# Patient Record
Sex: FEMALE | Race: BLACK OR AFRICAN AMERICAN | NOT HISPANIC OR LATINO | URBAN - METROPOLITAN AREA
[De-identification: names, ages, dates, MRNs, and addresses within clinical notes are randomized per-mention and may not be internally consistent; named-entity substitution may affect disease eponyms.]

---

## 2018-08-20 ENCOUNTER — OFFICE VISIT (OUTPATIENT)
Dept: URBAN - METROPOLITAN AREA CLINIC 23 | Facility: CLINIC | Age: 78
End: 2018-08-20
Payer: COMMERCIAL

## 2018-08-20 DIAGNOSIS — H40.013 OPEN ANGLE WITH BORDERLINE FINDINGS, LOW RISK, BILATERAL: Primary | ICD-10-CM

## 2018-08-20 PROCEDURE — 92014 COMPRE OPH EXAM EST PT 1/>: CPT | Performed by: OPTOMETRIST

## 2018-08-20 ASSESSMENT — KERATOMETRY
OD: 43.25
OS: 43.00

## 2018-08-20 ASSESSMENT — INTRAOCULAR PRESSURE
OD: 18
OS: 20

## 2018-08-20 ASSESSMENT — VISUAL ACUITY
OD: 20/40
OS: 20/25

## 2018-08-20 NOTE — IMPRESSION/PLAN
Impression: Open angle with borderline findings, low risk, bilateral: H40.013 OU. Plan: Borderline glaucoma, intraocular pressure stable without medication. Continue without medication and observe.

## 2018-08-20 NOTE — IMPRESSION/PLAN
Impression: Puckering of macula, left eye: H35.372. Plan: Discussed diagnosis in detail with patient. No treatment is required at this time. Will continue to observe condition and or symptoms. Patient instructed to call if condition gets worse.

## 2018-08-20 NOTE — IMPRESSION/PLAN
Impression: Dry eye syndrome of bilateral lacrimal glands: H04.123. Plan: Discussed diagnosis with patient. Recommend artificial tears for comfort.

## 2018-11-01 ENCOUNTER — OFFICE VISIT (OUTPATIENT)
Dept: URBAN - METROPOLITAN AREA CLINIC 23 | Facility: CLINIC | Age: 78
End: 2018-11-01

## 2018-11-01 ASSESSMENT — VISUAL ACUITY
OD: 20/40-
OS: 20/20

## 2020-08-12 ENCOUNTER — OFFICE VISIT (OUTPATIENT)
Dept: URBAN - METROPOLITAN AREA CLINIC 23 | Facility: CLINIC | Age: 80
End: 2020-08-12
Payer: COMMERCIAL

## 2020-08-12 DIAGNOSIS — H40.1134 PRIMARY OPEN-ANGLE GLAUCOMA, BILATERAL, INDETERMINATE STAGE: Primary | ICD-10-CM

## 2020-08-12 PROCEDURE — 92014 COMPRE OPH EXAM EST PT 1/>: CPT | Performed by: OPTOMETRIST

## 2020-08-12 ASSESSMENT — INTRAOCULAR PRESSURE
OS: 20
OD: 22

## 2020-08-12 ASSESSMENT — KERATOMETRY
OD: 42.63
OS: 42.75

## 2020-08-12 NOTE — IMPRESSION/PLAN
Impression: Primary open-angle glaucoma, bilateral, indeterminate stage: H40.1134 OU. Plan: Borderline glaucoma, intraocular pressure stable without medication. Continue without medication and observe.

## 2020-08-12 NOTE — IMPRESSION/PLAN
Impression: Puckering of macula, left eye: H35.372 OS. Plan: Discussed diagnosis in detail with patient. No treatment is required at this time. Reassured patient of current condition and treatment. Will continue to observe condition and or symptoms.

## 2020-08-21 ENCOUNTER — OFFICE VISIT (OUTPATIENT)
Dept: URBAN - METROPOLITAN AREA CLINIC 22 | Facility: CLINIC | Age: 80
End: 2020-08-21
Payer: COMMERCIAL

## 2020-08-21 DIAGNOSIS — Z96.1 PRESENCE OF INTRAOCULAR LENS: ICD-10-CM

## 2020-08-21 DIAGNOSIS — H52.03 HYPERMETROPIA, BILATERAL: Primary | ICD-10-CM

## 2020-08-21 PROCEDURE — 92014 COMPRE OPH EXAM EST PT 1/>: CPT | Performed by: OPTOMETRIST

## 2020-08-21 ASSESSMENT — VISUAL ACUITY
OS: 20/20
OD: 20/30

## 2020-08-21 ASSESSMENT — INTRAOCULAR PRESSURE
OS: 21
OD: 25

## 2020-08-21 NOTE — IMPRESSION/PLAN
Impression: Primary open-angle glaucoma, bilateral, stage unspecified: H40.1130.  Plan: marialuisa Zavala

## 2021-12-23 ENCOUNTER — OFFICE VISIT (OUTPATIENT)
Dept: URBAN - METROPOLITAN AREA CLINIC 22 | Facility: CLINIC | Age: 81
End: 2021-12-23
Payer: MEDICARE

## 2021-12-23 DIAGNOSIS — H04.123 DRY EYE SYNDROME OF BILATERAL LACRIMAL GLANDS: ICD-10-CM

## 2021-12-23 DIAGNOSIS — H35.372 PUCKERING OF MACULA, LEFT EYE: ICD-10-CM

## 2021-12-23 DIAGNOSIS — H40.1130 PRIMARY OPEN-ANGLE GLAUCOMA, BILATERAL, STAGE UNSPECIFIED: Primary | ICD-10-CM

## 2021-12-23 PROCEDURE — 99214 OFFICE O/P EST MOD 30 MIN: CPT | Performed by: STUDENT IN AN ORGANIZED HEALTH CARE EDUCATION/TRAINING PROGRAM

## 2021-12-23 PROCEDURE — 92133 CPTRZD OPH DX IMG PST SGM ON: CPT | Performed by: STUDENT IN AN ORGANIZED HEALTH CARE EDUCATION/TRAINING PROGRAM

## 2021-12-23 RX ORDER — LATANOPROST 50 UG/ML
0.005 % SOLUTION OPHTHALMIC
Qty: 7.5 | Refills: 3 | Status: ACTIVE
Start: 2021-12-23

## 2021-12-23 ASSESSMENT — INTRAOCULAR PRESSURE
OD: 24
OS: 21
OS: 19
OD: 30

## 2021-12-23 ASSESSMENT — KERATOMETRY
OS: 43.00
OD: 42.75

## 2021-12-23 NOTE — IMPRESSION/PLAN
Impression: Primary open-angle glaucoma, bilateral, stage unspecified: H40.1130.
-- IOP 24/19 (post-dil 30/20)
-- OCT 12/23/21: OD 87; thin IT
                            OS 99; bdl nasal
-- fam hx: son Plan: Discussed findings and educated patient on condition, risk factors, treatment. Elevated IOP OD > OS today. OCT RNFL ordered shows inf temp thinning OD only. Recommend starting treatment to lower IOP OD. Discussed side effects. Stressed importance of compliance. Rx latanoprost QHS OU. RTC in 2 months for HVF 24-2 and IOP check.

## 2021-12-23 NOTE — IMPRESSION/PLAN
Impression: Puckering of macula, left eye: H35.372. Plan: Discussed findings. Treatment not indicated at this time. VA improved with autorefraction, recommend updating glasses. Monitor in 1 year with DFE and OCT macula.

## 2022-01-03 ENCOUNTER — OFFICE VISIT (OUTPATIENT)
Dept: URBAN - METROPOLITAN AREA CLINIC 22 | Facility: CLINIC | Age: 82
End: 2022-01-03
Payer: COMMERCIAL

## 2022-01-03 DIAGNOSIS — H52.4 PRESBYOPIA: Primary | ICD-10-CM

## 2022-01-03 PROCEDURE — 92014 COMPRE OPH EXAM EST PT 1/>: CPT | Performed by: STUDENT IN AN ORGANIZED HEALTH CARE EDUCATION/TRAINING PROGRAM

## 2022-01-03 ASSESSMENT — VISUAL ACUITY
OD: 20/50
OS: 20/20

## 2022-01-03 ASSESSMENT — INTRAOCULAR PRESSURE
OS: 16
OD: 20

## 2022-01-03 ASSESSMENT — KERATOMETRY
OS: 43.38
OD: 43.13

## 2022-02-23 ENCOUNTER — OFFICE VISIT (OUTPATIENT)
Dept: URBAN - METROPOLITAN AREA CLINIC 22 | Facility: CLINIC | Age: 82
End: 2022-02-23
Payer: MEDICARE

## 2022-02-23 DIAGNOSIS — H40.1111 PRIMARY OPEN-ANGLE GLAUCOMA, RIGHT EYE, MILD STAGE: Primary | ICD-10-CM

## 2022-02-23 PROCEDURE — 92083 EXTENDED VISUAL FIELD XM: CPT | Performed by: STUDENT IN AN ORGANIZED HEALTH CARE EDUCATION/TRAINING PROGRAM

## 2022-02-23 PROCEDURE — 99213 OFFICE O/P EST LOW 20 MIN: CPT | Performed by: STUDENT IN AN ORGANIZED HEALTH CARE EDUCATION/TRAINING PROGRAM

## 2022-02-23 ASSESSMENT — INTRAOCULAR PRESSURE
OS: 16
OD: 15
OS: 14

## 2022-02-23 NOTE — IMPRESSION/PLAN
Impression: Primary open-angle glaucoma, right eye, mild stage: H40.1111.
-- IOP 24/19 (post-dil 30/20) -- tx: latanoprost QHS OU
-- OCT 12/23/21: OD 87; thin IT
                            OS 99; bdl nasal
-- HVF 02/23/22: OD sup arc - stable OS inf edge - stable
-- fam hx: son Plan: Discussed findings. Patient educated on importance of compliance with follow-ups and treatment. HVF 24-2 today shows stable superior arcuate defect OD. Continue with latanoprost QHS OU. RTC in 6 months for DFE/OCT.

## 2022-08-23 ENCOUNTER — OFFICE VISIT (OUTPATIENT)
Dept: URBAN - METROPOLITAN AREA CLINIC 22 | Facility: CLINIC | Age: 82
End: 2022-08-23
Payer: MEDICARE

## 2022-08-23 DIAGNOSIS — Z96.1 PRESENCE OF INTRAOCULAR LENS: ICD-10-CM

## 2022-08-23 DIAGNOSIS — H40.1111 PRIMARY OPEN-ANGLE GLAUCOMA, RIGHT EYE, MILD STAGE: ICD-10-CM

## 2022-08-23 DIAGNOSIS — H40.1131 PRIMARY OPEN-ANGLE GLAUCOMA, MILD STAGE, BILATERAL: Primary | ICD-10-CM

## 2022-08-23 DIAGNOSIS — H35.372 PUCKERING OF MACULA, LEFT EYE: ICD-10-CM

## 2022-08-23 PROCEDURE — 92083 EXTENDED VISUAL FIELD XM: CPT | Performed by: STUDENT IN AN ORGANIZED HEALTH CARE EDUCATION/TRAINING PROGRAM

## 2022-08-23 PROCEDURE — 92133 CPTRZD OPH DX IMG PST SGM ON: CPT | Performed by: STUDENT IN AN ORGANIZED HEALTH CARE EDUCATION/TRAINING PROGRAM

## 2022-08-23 PROCEDURE — 99214 OFFICE O/P EST MOD 30 MIN: CPT | Performed by: STUDENT IN AN ORGANIZED HEALTH CARE EDUCATION/TRAINING PROGRAM

## 2022-08-23 RX ORDER — BRIMONIDINE TARTRATE 2 MG/ML
0.2 % SOLUTION/ DROPS OPHTHALMIC
Qty: 10 | Refills: 3 | Status: ACTIVE
Start: 2022-08-23

## 2022-08-23 ASSESSMENT — VISUAL ACUITY
OS: 20/20
OD: 20/30

## 2022-08-23 ASSESSMENT — INTRAOCULAR PRESSURE
OD: 20
OS: 20
OS: 26
OD: 26

## 2022-08-23 NOTE — IMPRESSION/PLAN
Impression: Puckering of macula, left eye: H35.372. Plan: Discussed findings and educated patient on condition. OCT ordered: mild. Surgical treatment not indicated at this time. Monitor in 1 year with DFE and OCT macula. Contact clinic sooner if any sudden worsening of vision.

## 2022-08-23 NOTE — IMPRESSION/PLAN
Impression: Primary open-angle glaucoma, mild stage, bilateral: H40.1131.
-- IOP 20/20, post-dil 26/26 (Tmax 27/21)
-- tx: latanoprost QHS OU
-- OCT 12/23/21: OD 87; thin IT
                            OS 99; bdl nasal
-- HVF 02/23/22: OD sup arc - stable OS inf edge - stable
-- fam hx: son Plan: Discussed findings. HVF today OD superior misses stable/improved. HVF OS stable shallow inferior edge misses. However IOP higher today OU. Patient does report she recently had cortisone shot into knee. Possible steroid response. Continue with latanoprost QHS OU. Add brimonidine BID OU. Patient educated on importance of compliance with follow-ups and treatment. RTC in 2 months for IOP check.

## 2022-08-23 NOTE — IMPRESSION/PLAN
Impression: Presence of intraocular lens: Z96.1. Plan: s/p YAG OD. Monitor, no treatment needed at this time.

## 2022-10-24 ENCOUNTER — OFFICE VISIT (OUTPATIENT)
Dept: URBAN - METROPOLITAN AREA CLINIC 22 | Facility: CLINIC | Age: 82
End: 2022-10-24
Payer: MEDICARE

## 2022-10-24 DIAGNOSIS — H40.1131 PRIMARY OPEN-ANGLE GLAUCOMA, MILD STAGE, BILATERAL: Primary | ICD-10-CM

## 2022-10-24 DIAGNOSIS — H16.223 KERATOCONJUNCT SICCA, NOT SPECIFIED AS SJOGREN'S, BILATERAL: ICD-10-CM

## 2022-10-24 PROCEDURE — 99214 OFFICE O/P EST MOD 30 MIN: CPT | Performed by: STUDENT IN AN ORGANIZED HEALTH CARE EDUCATION/TRAINING PROGRAM

## 2022-10-24 ASSESSMENT — INTRAOCULAR PRESSURE
OS: 18
OD: 18

## 2022-10-24 NOTE — IMPRESSION/PLAN
Impression: Keratoconjunct sicca, not specified as Sjogren's, bilateral: K33.686. Plan: Discussed today's findings and educated patient on condition. No FB observed. Rx artificial tears BID-QID OU (sample of Refresh Relieva given). Contact clinic if symptoms worsen.

## 2022-10-24 NOTE — IMPRESSION/PLAN
Impression: Primary open-angle glaucoma, mild stage, bilateral: H40.1131.
-- IOP 18/18 (Tmax 27/26)
-- tx: latanoprost QHS OU
-- OCT 12/23/21: OD 87; thin IT
                            OS 99; bdl nasal
-- HVF 02/23/22: OD sup arc - stable OS inf edge - stable
-- fam hx: son Plan: Discussed findings. IOP improved today vs. last visit; however patient admits to forgetting AM dose of brimonidine sometimes. Was not used this AM either. Hx of cortisone shot into knee, possible steroid response previously. Continue with latanoprost QHS OU and brimonidine BID OU. Stressed importance of compliance with follow-ups and treatment.

## 2023-02-06 ENCOUNTER — OFFICE VISIT (OUTPATIENT)
Dept: URBAN - METROPOLITAN AREA CLINIC 22 | Facility: CLINIC | Age: 83
End: 2023-02-06
Payer: MEDICARE

## 2023-02-06 DIAGNOSIS — H40.1131 PRIMARY OPEN-ANGLE GLAUCOMA, BILATERAL, MILD STAGE: Primary | ICD-10-CM

## 2023-02-06 PROCEDURE — 92083 EXTENDED VISUAL FIELD XM: CPT | Performed by: STUDENT IN AN ORGANIZED HEALTH CARE EDUCATION/TRAINING PROGRAM

## 2023-02-06 PROCEDURE — 99214 OFFICE O/P EST MOD 30 MIN: CPT | Performed by: STUDENT IN AN ORGANIZED HEALTH CARE EDUCATION/TRAINING PROGRAM

## 2023-02-06 RX ORDER — LATANOPROST 50 UG/ML
0.005 % SOLUTION OPHTHALMIC
Qty: 7.5 | Refills: 3 | Status: ACTIVE
Start: 2023-02-06

## 2023-02-06 RX ORDER — BRIMONIDINE TARTRATE AND TIMOLOL MALEATE 2; 5 MG/ML; MG/ML
SOLUTION/ DROPS OPHTHALMIC
Qty: 10 | Refills: 3 | Status: ACTIVE
Start: 2023-02-06

## 2023-02-06 ASSESSMENT — INTRAOCULAR PRESSURE
OS: 18
OD: 21

## 2023-02-06 NOTE — IMPRESSION/PLAN
Impression: Primary open-angle glaucoma, mild stage, bilateral: H40.1131.
-- IOP 21/18 (Tmax 27/26)
-- tx: latanoprost QHS OU
-- OCT 08/23/22: OD 91; bdl inf
                            ; WNL
-- HVF 02/06/23: OD sup arc progressing OS inf edge - stable
-- fam hx: son
-- possible steroid reponder; cortisone inj in knee
-- denies lung dz Plan: Discussed findings. HVF OD: superior nasal defects appear to be deepening HVF OS: inferior edge msises relatively stable IOP OD > OS today. Continue: latanoprost QHS OU
D/c brimonidine; Rx brimonidine-timolol BID OU instead. Stressed importance of compliance with follow-ups and treatment. RTC in 2 mo for IOP check.

## 2023-03-06 ENCOUNTER — OFFICE VISIT (OUTPATIENT)
Dept: URBAN - METROPOLITAN AREA OPTICAL 7 | Facility: OPTICAL | Age: 83
End: 2023-03-06
Payer: COMMERCIAL

## 2023-03-06 DIAGNOSIS — H52.4 PRESBYOPIA: Primary | ICD-10-CM

## 2023-03-06 PROCEDURE — V2781 PROGRESSIVE LENS PER LENS: HCPCS | Performed by: STUDENT IN AN ORGANIZED HEALTH CARE EDUCATION/TRAINING PROGRAM

## 2023-03-06 PROCEDURE — V2744 TINT PHOTOCHROMATIC LENS/ES: HCPCS | Performed by: STUDENT IN AN ORGANIZED HEALTH CARE EDUCATION/TRAINING PROGRAM

## 2023-03-06 PROCEDURE — V2750 ANTI-REFLECTIVE COATING: HCPCS | Performed by: STUDENT IN AN ORGANIZED HEALTH CARE EDUCATION/TRAINING PROGRAM

## 2023-03-06 PROCEDURE — V2755 UV LENS/ES: HCPCS | Performed by: STUDENT IN AN ORGANIZED HEALTH CARE EDUCATION/TRAINING PROGRAM

## 2023-03-06 PROCEDURE — 92014 COMPRE OPH EXAM EST PT 1/>: CPT | Performed by: STUDENT IN AN ORGANIZED HEALTH CARE EDUCATION/TRAINING PROGRAM

## 2023-03-06 PROCEDURE — V2784 LENS POLYCARB OR EQUAL: HCPCS | Performed by: STUDENT IN AN ORGANIZED HEALTH CARE EDUCATION/TRAINING PROGRAM

## 2023-03-06 ASSESSMENT — INTRAOCULAR PRESSURE
OD: 13
OS: 11

## 2023-03-06 ASSESSMENT — VISUAL ACUITY
OS: 20/20
OD: 20/25

## 2023-04-11 ENCOUNTER — OFFICE VISIT (OUTPATIENT)
Dept: URBAN - METROPOLITAN AREA CLINIC 22 | Facility: CLINIC | Age: 83
End: 2023-04-11
Payer: MEDICARE

## 2023-04-11 DIAGNOSIS — H40.1131 PRIMARY OPEN-ANGLE GLAUCOMA, MILD STAGE, BILATERAL: Primary | ICD-10-CM

## 2023-04-11 DIAGNOSIS — H52.4 PRESBYOPIA: ICD-10-CM

## 2023-04-11 PROCEDURE — 99214 OFFICE O/P EST MOD 30 MIN: CPT | Performed by: STUDENT IN AN ORGANIZED HEALTH CARE EDUCATION/TRAINING PROGRAM

## 2023-04-11 RX ORDER — DORZOLAMIDE HYDROCHLORIDE AND TIMOLOL MALEATE 20; 5 MG/ML; MG/ML
SOLUTION/ DROPS OPHTHALMIC
Qty: 10 | Refills: 0 | Status: ACTIVE
Start: 2023-04-11

## 2023-04-11 ASSESSMENT — INTRAOCULAR PRESSURE
OS: 16
OD: 16

## 2023-04-11 NOTE — IMPRESSION/PLAN
Impression: Primary open-angle glaucoma, mild stage, bilateral: H40.1131.
-- IOP 16/16 (Tmax 27/26)
-- tx: latanoprost QHS OU, combigan BID OU
-- OCT 08/23/22: OD 91; bdl inf
                            ; WNL
-- HVF 02/06/23: OD sup arc progressing OS inf edge - stable
-- fam hx: son
-- possible steroid reponder; cortisone inj in knee
-- denies lung dz Plan: Discussed findings. IOP improved w/ addition of timolol, however pt is having adverse rxn to brimonidine. Continue: latanoprost QHS OU
D/c brimonidine; Rx dorzolamide-timolol BID OU instead. Stressed importance of compliance with follow-ups and treatment. RTC in 2 mo for IOP check.

## 2023-06-12 ENCOUNTER — OFFICE VISIT (OUTPATIENT)
Dept: URBAN - METROPOLITAN AREA CLINIC 22 | Facility: CLINIC | Age: 83
End: 2023-06-12
Payer: MEDICARE

## 2023-06-12 DIAGNOSIS — H40.1131 PRIMARY OPEN-ANGLE GLAUCOMA, MILD STAGE, BILATERAL: Primary | ICD-10-CM

## 2023-06-12 PROCEDURE — 99214 OFFICE O/P EST MOD 30 MIN: CPT | Performed by: STUDENT IN AN ORGANIZED HEALTH CARE EDUCATION/TRAINING PROGRAM

## 2023-06-12 RX ORDER — DORZOLAMIDE HYDROCHLORIDE AND TIMOLOL MALEATE 20; 5 MG/ML; MG/ML
SOLUTION/ DROPS OPHTHALMIC
Qty: 20 | Refills: 3 | Status: ACTIVE
Start: 2023-06-12

## 2023-06-12 ASSESSMENT — INTRAOCULAR PRESSURE
OD: 21
OS: 15
OD: 20

## 2023-06-12 NOTE — IMPRESSION/PLAN
Impression: Primary open-angle glaucoma, mild stage, bilateral: H40.1131.
-- IOP 16/16 (Tmax 27/26)
-- tx: latanoprost QHS OU, Cosopt BID OU
--adverse rxn: brimonidine
-- OCT 08/23/22: OD 91; bdl inf
                            ; WNL
-- HVF 02/06/23: OD sup arc progressing OS inf edge - stable
-- fam hx: son
-- possible steroid reponder; cortisone inj in knee
-- denies lung dz Plan: Discussed findings. IOP elevated today, however patient ran out of Cosopt few days ago. Continue: latanoprost QHS OU, dorzolamide-timolol BID OU (refills sent) Stressed importance of compliance with follow-ups and treatment. RTC in 2 mo for DFE and OCT RNFL.

## 2023-08-15 ENCOUNTER — OFFICE VISIT (OUTPATIENT)
Dept: URBAN - METROPOLITAN AREA CLINIC 22 | Facility: CLINIC | Age: 83
End: 2023-08-15
Payer: MEDICARE

## 2023-08-15 DIAGNOSIS — H35.372 PUCKERING OF MACULA, LEFT EYE: ICD-10-CM

## 2023-08-15 DIAGNOSIS — Z96.1 PRESENCE OF INTRAOCULAR LENS: ICD-10-CM

## 2023-08-15 DIAGNOSIS — H40.1131 PRIMARY OPEN-ANGLE GLAUCOMA, MILD STAGE, BILATERAL: Primary | ICD-10-CM

## 2023-08-15 DIAGNOSIS — H16.223 KERATOCONJUNCT SICCA, NOT SPECIFIED AS SJOGREN'S, BILATERAL: ICD-10-CM

## 2023-08-15 PROCEDURE — 99214 OFFICE O/P EST MOD 30 MIN: CPT | Performed by: STUDENT IN AN ORGANIZED HEALTH CARE EDUCATION/TRAINING PROGRAM

## 2023-08-15 PROCEDURE — 92133 CPTRZD OPH DX IMG PST SGM ON: CPT | Performed by: STUDENT IN AN ORGANIZED HEALTH CARE EDUCATION/TRAINING PROGRAM

## 2023-08-15 ASSESSMENT — VISUAL ACUITY
OS: 20/20
OD: 20/50

## 2023-08-15 ASSESSMENT — INTRAOCULAR PRESSURE
OD: 14
OS: 12

## 2024-02-15 ENCOUNTER — OFFICE VISIT (OUTPATIENT)
Dept: URBAN - METROPOLITAN AREA CLINIC 22 | Facility: CLINIC | Age: 84
End: 2024-02-15
Payer: MEDICARE

## 2024-02-15 DIAGNOSIS — H40.1131 PRIMARY OPEN-ANGLE GLAUCOMA, BILATERAL, MILD STAGE: Primary | ICD-10-CM

## 2024-02-15 DIAGNOSIS — H52.4 PRESBYOPIA: ICD-10-CM

## 2024-02-15 PROCEDURE — 92083 EXTENDED VISUAL FIELD XM: CPT | Performed by: STUDENT IN AN ORGANIZED HEALTH CARE EDUCATION/TRAINING PROGRAM

## 2024-02-15 PROCEDURE — 92133 CPTRZD OPH DX IMG PST SGM ON: CPT | Performed by: STUDENT IN AN ORGANIZED HEALTH CARE EDUCATION/TRAINING PROGRAM

## 2024-02-15 PROCEDURE — 99214 OFFICE O/P EST MOD 30 MIN: CPT | Performed by: STUDENT IN AN ORGANIZED HEALTH CARE EDUCATION/TRAINING PROGRAM

## 2024-02-15 RX ORDER — LATANOPROST 50 UG/ML
0.005 % SOLUTION OPHTHALMIC
Qty: 7.5 | Refills: 3 | Status: ACTIVE
Start: 2024-02-15

## 2024-02-15 ASSESSMENT — INTRAOCULAR PRESSURE
OS: 20
OD: 22

## 2024-02-16 ENCOUNTER — OFFICE VISIT (OUTPATIENT)
Dept: URBAN - METROPOLITAN AREA CLINIC 22 | Facility: CLINIC | Age: 84
End: 2024-02-16
Payer: COMMERCIAL

## 2024-02-16 PROCEDURE — 92014 COMPRE OPH EXAM EST PT 1/>: CPT | Performed by: STUDENT IN AN ORGANIZED HEALTH CARE EDUCATION/TRAINING PROGRAM

## 2024-02-16 ASSESSMENT — INTRAOCULAR PRESSURE
OD: 14
OS: 10

## 2024-02-16 ASSESSMENT — VISUAL ACUITY
OS: 20/20
OD: 20/50

## 2024-03-28 ENCOUNTER — OFFICE VISIT (OUTPATIENT)
Dept: URBAN - METROPOLITAN AREA CLINIC 22 | Facility: CLINIC | Age: 84
End: 2024-03-28
Payer: MEDICARE

## 2024-03-28 DIAGNOSIS — H40.1131 PRIMARY OPEN-ANGLE GLAUCOMA, BILATERAL, MILD STAGE: Primary | ICD-10-CM

## 2024-03-28 PROCEDURE — 99213 OFFICE O/P EST LOW 20 MIN: CPT | Performed by: STUDENT IN AN ORGANIZED HEALTH CARE EDUCATION/TRAINING PROGRAM

## 2024-03-28 PROCEDURE — 92083 EXTENDED VISUAL FIELD XM: CPT | Performed by: STUDENT IN AN ORGANIZED HEALTH CARE EDUCATION/TRAINING PROGRAM

## 2024-03-28 ASSESSMENT — INTRAOCULAR PRESSURE
OD: 16
OS: 16

## 2024-10-15 ENCOUNTER — OFFICE VISIT (OUTPATIENT)
Dept: URBAN - METROPOLITAN AREA CLINIC 22 | Facility: CLINIC | Age: 84
End: 2024-10-15
Payer: MEDICARE

## 2024-10-15 DIAGNOSIS — Z96.1 PRESENCE OF INTRAOCULAR LENS: ICD-10-CM

## 2024-10-15 DIAGNOSIS — H40.1131 PRIMARY OPEN-ANGLE GLAUCOMA, BILATERAL, MILD STAGE: Primary | ICD-10-CM

## 2024-10-15 DIAGNOSIS — H35.372 PUCKERING OF MACULA, LEFT EYE: ICD-10-CM

## 2024-10-15 PROCEDURE — 99214 OFFICE O/P EST MOD 30 MIN: CPT | Performed by: STUDENT IN AN ORGANIZED HEALTH CARE EDUCATION/TRAINING PROGRAM

## 2024-10-15 PROCEDURE — 92133 CPTRZD OPH DX IMG PST SGM ON: CPT | Performed by: STUDENT IN AN ORGANIZED HEALTH CARE EDUCATION/TRAINING PROGRAM

## 2024-10-15 ASSESSMENT — VISUAL ACUITY
OD: 20/50
OS: 20/25

## 2024-10-15 ASSESSMENT — INTRAOCULAR PRESSURE
OD: 17
OS: 16

## 2024-11-05 ENCOUNTER — OFFICE VISIT (OUTPATIENT)
Dept: URBAN - METROPOLITAN AREA CLINIC 22 | Facility: CLINIC | Age: 84
End: 2024-11-05
Payer: MEDICARE

## 2024-11-05 DIAGNOSIS — H40.1131 PRIMARY OPEN-ANGLE GLAUCOMA, MILD STAGE, BILATERAL: Primary | ICD-10-CM

## 2024-11-05 PROCEDURE — 99214 OFFICE O/P EST MOD 30 MIN: CPT | Performed by: STUDENT IN AN ORGANIZED HEALTH CARE EDUCATION/TRAINING PROGRAM

## 2024-11-05 ASSESSMENT — INTRAOCULAR PRESSURE
OD: 16
OS: 16

## 2024-11-27 ENCOUNTER — OFFICE VISIT (OUTPATIENT)
Dept: URBAN - METROPOLITAN AREA CLINIC 23 | Facility: CLINIC | Age: 84
End: 2024-11-27
Payer: MEDICARE

## 2024-11-27 DIAGNOSIS — H40.053 OCULAR HYPERTENSION, BILATERAL: Primary | ICD-10-CM

## 2024-11-27 PROCEDURE — 92020 GONIOSCOPY: CPT | Performed by: OPHTHALMOLOGY

## 2024-11-27 PROCEDURE — 76514 ECHO EXAM OF EYE THICKNESS: CPT | Performed by: OPHTHALMOLOGY

## 2024-11-27 PROCEDURE — 92133 CPTRZD OPH DX IMG PST SGM ON: CPT | Performed by: OPHTHALMOLOGY

## 2024-11-27 PROCEDURE — 92004 COMPRE OPH EXAM NEW PT 1/>: CPT | Performed by: OPHTHALMOLOGY

## 2024-11-27 RX ORDER — DORZOLAMIDE HYDROCHLORIDE AND TIMOLOL MALEATE 20; 5 MG/ML; MG/ML
SOLUTION/ DROPS OPHTHALMIC
Qty: 20 | Refills: 3 | Status: ACTIVE
Start: 2024-11-27

## 2024-11-27 RX ORDER — LATANOPROST 50 UG/ML
0.005 % SOLUTION OPHTHALMIC
Qty: 7.5 | Refills: 3 | Status: ACTIVE
Start: 2024-11-27

## 2024-11-27 RX ORDER — NETARSUDIL 0.2 MG/ML
0.02 % SOLUTION/ DROPS OPHTHALMIC; TOPICAL
Qty: 7.5 | Refills: 4 | Status: ACTIVE
Start: 2024-11-27

## 2024-11-27 ASSESSMENT — INTRAOCULAR PRESSURE
OD: 16
OS: 16

## 2025-01-30 ENCOUNTER — OFFICE VISIT (OUTPATIENT)
Dept: URBAN - METROPOLITAN AREA CLINIC 23 | Facility: CLINIC | Age: 85
End: 2025-01-30
Payer: MEDICARE

## 2025-01-30 DIAGNOSIS — H40.053 OCULAR HYPERTENSION, BILATERAL: Primary | ICD-10-CM

## 2025-01-30 PROCEDURE — 99213 OFFICE O/P EST LOW 20 MIN: CPT | Performed by: OPHTHALMOLOGY

## 2025-01-30 ASSESSMENT — INTRAOCULAR PRESSURE
OD: 17
OS: 13

## 2025-03-24 ENCOUNTER — OFFICE VISIT (OUTPATIENT)
Dept: URBAN - METROPOLITAN AREA CLINIC 23 | Facility: CLINIC | Age: 85
End: 2025-03-24
Payer: MEDICARE

## 2025-03-24 DIAGNOSIS — H40.053 OCULAR HYPERTENSION, BILATERAL: Primary | ICD-10-CM

## 2025-03-24 PROCEDURE — 99213 OFFICE O/P EST LOW 20 MIN: CPT | Performed by: OPHTHALMOLOGY

## 2025-03-24 ASSESSMENT — INTRAOCULAR PRESSURE
OS: 15
OD: 19